# Patient Record
Sex: MALE | Race: WHITE | NOT HISPANIC OR LATINO | URBAN - METROPOLITAN AREA
[De-identification: names, ages, dates, MRNs, and addresses within clinical notes are randomized per-mention and may not be internally consistent; named-entity substitution may affect disease eponyms.]

---

## 2025-01-11 ENCOUNTER — OFFICE VISIT (OUTPATIENT)
Dept: URBAN - METROPOLITAN AREA TELEHEALTH 2 | Facility: TELEHEALTH | Age: 30
End: 2025-01-11

## 2025-01-14 ENCOUNTER — OFFICE VISIT (OUTPATIENT)
Dept: URBAN - METROPOLITAN AREA TELEHEALTH 2 | Facility: TELEHEALTH | Age: 30
End: 2025-01-14
Payer: COMMERCIAL

## 2025-01-14 DIAGNOSIS — R14.0 ABDOMINAL BLOATING: ICD-10-CM

## 2025-01-14 PROCEDURE — 99203 OFFICE O/P NEW LOW 30 MIN: CPT | Performed by: INTERNAL MEDICINE

## 2025-01-14 NOTE — HPI-TODAY'S VISIT:
This is a Telehealth OV to which patient has agreed to. Limitations of TH discussed; patient understands and agrees to proceed. Pt's at home during this virtual OV. 28 yo pt who presents w 2 mos of abdominal discomfort, usually after breakfast. No N/V / dysphagia / odynophagia nor minerva KHANH sxs. Has restricted himself from drinking coffee, and eating eggs or milk products. Tried Nexium 40 mg po qd x 5 days, wo much improvement. He reports having good days occasionally. Has been on a healthy diet lately. Has normal bm's wo change in bowel consistency nor melenic stools or hematochezia. No anorexia or weight loss. No cardiorespiratory sxs. Has been on no ASA / NSAIDs. Has been drinking a sport supplement: Athletic Green (? Ingredients) x 3 years. Normal labs 4 mos ago, no copy available. He had palpitations 7 mos ago, seen in the ER @ [facility] [place]: normal A + P CT scan and normal cardiac evaluation. No recurrence of palpitations since.   EGD 8/14 w Dr. 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 w normal duodenal bx's. Labs 2015: normal CBC, CMP, lipase, D4, CRP, negative Rafael serology and + IBD-serology (C. cerevisiae, negative pANCA). Normal A + P CT scan 2015.  He has no change in bowel pattern and no FHx IBD. Patient is living in [place] currently w his girlfriend. Wants to know if he can be seen in [place] / [place]  or [place], instead of coming back to [place] (he will if he cannot get an appointment close to [place]).

## 2025-01-15 ENCOUNTER — TELEPHONE ENCOUNTER (OUTPATIENT)
Dept: URBAN - METROPOLITAN AREA CLINIC 98 | Facility: CLINIC | Age: 30
End: 2025-01-15

## 2025-01-16 ENCOUNTER — WEB ENCOUNTER (OUTPATIENT)
Dept: URBAN - METROPOLITAN AREA CLINIC 111 | Facility: CLINIC | Age: 30
End: 2025-01-16

## 2025-01-17 ENCOUNTER — DASHBOARD ENCOUNTERS (OUTPATIENT)
Age: 30
End: 2025-01-17

## 2025-01-18 ENCOUNTER — LAB OUTSIDE AN ENCOUNTER (OUTPATIENT)
Dept: URBAN - METROPOLITAN AREA CLINIC 98 | Facility: CLINIC | Age: 30
End: 2025-01-18

## 2025-01-21 ENCOUNTER — TELEPHONE ENCOUNTER (OUTPATIENT)
Dept: URBAN - METROPOLITAN AREA CLINIC 111 | Facility: CLINIC | Age: 30
End: 2025-01-21

## 2025-01-30 ENCOUNTER — OFFICE VISIT (OUTPATIENT)
Dept: URBAN - METROPOLITAN AREA SURGERY CENTER 18 | Facility: SURGERY CENTER | Age: 30
End: 2025-01-30

## 2025-02-11 ENCOUNTER — OFFICE VISIT (OUTPATIENT)
Dept: URBAN - METROPOLITAN AREA SURGERY CENTER 18 | Facility: SURGERY CENTER | Age: 30
End: 2025-02-11